# Patient Record
Sex: MALE | Race: WHITE | Employment: FULL TIME | URBAN - METROPOLITAN AREA
[De-identification: names, ages, dates, MRNs, and addresses within clinical notes are randomized per-mention and may not be internally consistent; named-entity substitution may affect disease eponyms.]

---

## 2020-12-20 ENCOUNTER — HOSPITAL ENCOUNTER (EMERGENCY)
Age: 40
Discharge: HOME | End: 2020-12-20
Attending: EMERGENCY MEDICINE
Payer: COMMERCIAL

## 2020-12-20 VITALS
SYSTOLIC BLOOD PRESSURE: 162 MMHG | DIASTOLIC BLOOD PRESSURE: 97 MMHG | TEMPERATURE: 98.2 F | OXYGEN SATURATION: 99 % | HEIGHT: 65 IN | RESPIRATION RATE: 17 BRPM | HEART RATE: 64 BPM | WEIGHT: 135 LBS | BODY MASS INDEX: 22.49 KG/M2

## 2020-12-20 PROCEDURE — 6370000000 HC RX 637 (ALT 250 FOR IP): Performed by: EMERGENCY MEDICINE

## 2020-12-20 PROCEDURE — 99283 EMERGENCY DEPT VISIT LOW MDM: CPT

## 2020-12-20 RX ORDER — PREDNISONE 20 MG/1
40 TABLET ORAL DAILY
Qty: 10 TABLET | Refills: 0 | Status: SHIPPED | OUTPATIENT
Start: 2020-12-20 | End: 2020-12-20 | Stop reason: SDUPTHER

## 2020-12-20 RX ORDER — PREDNISONE 20 MG/1
60 TABLET ORAL ONCE
Status: COMPLETED | OUTPATIENT
Start: 2020-12-20 | End: 2020-12-20

## 2020-12-20 RX ORDER — PREDNISONE 20 MG/1
40 TABLET ORAL DAILY
Qty: 10 TABLET | Refills: 0 | Status: SHIPPED | OUTPATIENT
Start: 2020-12-20 | End: 2020-12-25

## 2020-12-20 RX ADMIN — PREDNISONE 60 MG: 20 TABLET ORAL at 09:09

## 2020-12-20 ASSESSMENT — ENCOUNTER SYMPTOMS
SORE THROAT: 1
ABDOMINAL PAIN: 0
VOMITING: 0
SHORTNESS OF BREATH: 0
NAUSEA: 0
EYE PAIN: 0
CHEST TIGHTNESS: 0

## 2020-12-20 NOTE — ED PROVIDER NOTES
[address] [facility] ED  EMERGENCY DEPARTMENT ENCOUNTER      Pt Name: Layla Tapia  MRN: [id number]  [place] 1980  Date of evaluation: 12/20/2020  Provider: Bart Roblero DO    CHIEF COMPLAINT       Chief Complaint   Patient presents with    Other     uvula swollen since this mornging          HISTORY OF PRESENT ILLNESS   (Location/Symptom, Timing/Onset, Context/Setting, Quality, Duration, Modifying Factors, Severity)  Note limiting factors. Layla Tapia is a 36 y.o. male who presents to the emergency department . Patient comes in with swollen uvula. He states that he is gagging on it. He is not having trouble swallowing or talking or eating or drinking. No other areas of his mouth are swollen. He is not on any medications or over-the-counter remedies. No recent ingestion of caustic agents or overly hot liquids. Otherwise he generally feels fine. HPI    Nursing Notes were reviewed. REVIEW OF SYSTEMS    (2-9 systems for level 4, 10 or more for level 5)     Review of Systems   Constitutional: Negative for activity change, appetite change, fatigue and fever. HENT: Positive for sore throat. Negative for congestion. Eyes: Negative for pain and visual disturbance. Respiratory: Negative for chest tightness and shortness of breath. Cardiovascular: Negative for chest pain. Gastrointestinal: Negative for abdominal pain, nausea and vomiting. Endocrine: Negative for polydipsia. Genitourinary: Negative for flank pain and urgency. Musculoskeletal: Negative for gait problem and neck stiffness. Skin: Negative for rash. Neurological: Negative for weakness, light-headedness and headaches. Psychiatric/Behavioral: Negative for confusion and sleep disturbance. Except as noted above the remainder of the review of systems was reviewed and negative.        PAST MEDICAL HISTORY     Past Medical History:   Diagnosis Date    COVID-19          SURGICAL HISTORY     History reviewed. No pertinent surgical history. CURRENT MEDICATIONS       Previous Medications    CELECOXIB (CELEBREX) 200 MG CAPSULE    Take 200 mg by mouth 2 times daily. IBUPROFEN (ADVIL;MOTRIN) 200 MG TABLET    Take 200 mg by mouth every 6 hours as needed. Take 4 pills in the am and 4 pills at night        ALLERGIES     Ceclor [cefaclor]    FAMILY HISTORY     History reviewed. No pertinent family history.        SOCIAL HISTORY       Social History     Socioeconomic History    Marital status:      Spouse name: None    Number of children: None    Years of education: None    Highest education level: None   Occupational History    None   Social Needs    Financial resource strain: None    Food insecurity     Worry: None     Inability: None    Transportation needs     Medical: None     Non-medical: None   Tobacco Use    Smoking status: Never Smoker    Smokeless tobacco: Never Used   Substance and Sexual Activity    Alcohol use: Yes     Comment: rare    Drug use: Never    Sexual activity: Never   Lifestyle    Physical activity     Days per week: None     Minutes per session: None    Stress: None   Relationships    Social connections     Talks on phone: None     Gets together: None     Attends Latter day service: None     Active member of club or organization: None     Attends meetings of clubs or organizations: None     Relationship status: None    Intimate partner violence     Fear of current or ex partner: None     Emotionally abused: None     Physically abused: None     Forced sexual activity: None   Other Topics Concern    None   Social History Narrative    None       SCREENINGS                        PHYSICAL EXAM    (up to 7 for level 4, 8 or more for level 5)     ED Triage Vitals [12/20/20 0836]   BP Temp Temp Source Pulse Resp SpO2 Height Weight   (!) 162/97 98.2 °F (36.8 °C) Temporal 64 17 99 % 5' 5\" (1.651 m) 135 lb (61.2 kg)       Physical Exam  Vitals signs and nursing note reviewed. Constitutional:       General: He is not in acute distress. Appearance: He is well-developed. He is not diaphoretic. HENT:      Head: Normocephalic and atraumatic. Right Ear: External ear normal.      Left Ear: External ear normal.      Mouth/Throat:      Pharynx: No oropharyngeal exudate. Comments: Uvula mildly swollen. Uvula midline. No tonsillar swelling. Tongue and lips within normal limits  Eyes:      Conjunctiva/sclera: Conjunctivae normal.      Pupils: Pupils are equal, round, and reactive to light. Neck:      Musculoskeletal: Normal range of motion and neck supple. Thyroid: No thyromegaly. Vascular: No JVD. Trachea: No tracheal deviation. Cardiovascular:      Rate and Rhythm: Normal rate. Heart sounds: Normal heart sounds. No murmur. Pulmonary:      Effort: Pulmonary effort is normal. No respiratory distress. Breath sounds: Normal breath sounds. No wheezing. Abdominal:      General: Bowel sounds are normal.      Palpations: Abdomen is soft. Tenderness: There is no abdominal tenderness. There is no guarding. Musculoskeletal: Normal range of motion. Skin:     General: Skin is warm and dry. Findings: No rash. Neurological:      Mental Status: He is alert and oriented to person, place, and time. Cranial Nerves: No cranial nerve deficit.    Psychiatric:         Behavior: Behavior normal.         DIAGNOSTIC RESULTS     EKG: All EKG's are interpreted by the Emergency Department Physician who either signs or Co-signs this chart in the absence of a cardiologist.        RADIOLOGY:   Non-plain film images such as CT, Ultrasound and MRI are read by the radiologist. Plain radiographic images are visualized and preliminarily interpreted by the emergency physician with the below findings:        Interpretation per the Radiologist below, if available at the time of this note:    No orders to display         ED BEDSIDE ULTRASOUND:   Performed

## 2021-12-27 ENCOUNTER — APPOINTMENT (OUTPATIENT)
Dept: GENERAL RADIOLOGY | Age: 41
End: 2021-12-27
Payer: COMMERCIAL

## 2021-12-27 ENCOUNTER — HOSPITAL ENCOUNTER (EMERGENCY)
Age: 41
Discharge: HOME | End: 2021-12-27
Payer: COMMERCIAL

## 2021-12-27 VITALS
OXYGEN SATURATION: 99 % | SYSTOLIC BLOOD PRESSURE: 137 MMHG | WEIGHT: 197 LBS | RESPIRATION RATE: 16 BRPM | BODY MASS INDEX: 32.78 KG/M2 | HEART RATE: 57 BPM | DIASTOLIC BLOOD PRESSURE: 88 MMHG | TEMPERATURE: 97.7 F

## 2021-12-27 DIAGNOSIS — R07.89 ATYPICAL CHEST PAIN: Primary | ICD-10-CM

## 2021-12-27 LAB
ALBUMIN SERPL-MCNC: 4.8 G/DL (ref 3.5–4.6)
ALP BLD-CCNC: 58 U/L (ref 35–104)
ALT SERPL-CCNC: 29 U/L (ref 0–41)
ANION GAP SERPL CALCULATED.3IONS-SCNC: 11 MEQ/L (ref 9–15)
AST SERPL-CCNC: 19 U/L (ref 0–40)
BASOPHILS ABSOLUTE: 0 K/UL (ref 0–0.2)
BASOPHILS RELATIVE PERCENT: 0.8 %
BILIRUB SERPL-MCNC: 0.3 MG/DL (ref 0.2–0.7)
BUN BLDV-MCNC: 13 MG/DL (ref 6–20)
CALCIUM SERPL-MCNC: 9.6 MG/DL (ref 8.5–9.9)
CHLORIDE BLD-SCNC: 99 MEQ/L (ref 95–107)
CO2: 27 MEQ/L (ref 20–31)
CREAT SERPL-MCNC: 0.88 MG/DL (ref 0.7–1.2)
EOSINOPHILS ABSOLUTE: 0.2 K/UL (ref 0–0.7)
EOSINOPHILS RELATIVE PERCENT: 3.2 %
GFR AFRICAN AMERICAN: >60
GFR NON-AFRICAN AMERICAN: >60
GLOBULIN: 2.8 G/DL (ref 2.3–3.5)
GLUCOSE BLD-MCNC: 92 MG/DL (ref 70–99)
HCT VFR BLD CALC: 46.6 % (ref 42–52)
HEMOGLOBIN: 15.7 G/DL (ref 14–18)
LYMPHOCYTES ABSOLUTE: 2.5 K/UL (ref 1–4.8)
LYMPHOCYTES RELATIVE PERCENT: 43.1 %
MAGNESIUM: 2.3 MG/DL (ref 1.7–2.4)
MCH RBC QN AUTO: 28.1 PG (ref 27–31.3)
MCHC RBC AUTO-ENTMCNC: 33.6 % (ref 33–37)
MCV RBC AUTO: 83.6 FL (ref 80–100)
MONOCYTES ABSOLUTE: 0.4 K/UL (ref 0.2–0.8)
MONOCYTES RELATIVE PERCENT: 6.9 %
NEUTROPHILS ABSOLUTE: 2.7 K/UL (ref 1.4–6.5)
NEUTROPHILS RELATIVE PERCENT: 46 %
PDW BLD-RTO: 13.8 % (ref 11.5–14.5)
PLATELET # BLD: 186 K/UL (ref 130–400)
POTASSIUM SERPL-SCNC: 4 MEQ/L (ref 3.4–4.9)
RBC # BLD: 5.58 M/UL (ref 4.7–6.1)
SODIUM BLD-SCNC: 137 MEQ/L (ref 135–144)
TOTAL PROTEIN: 7.6 G/DL (ref 6.3–8)
TROPONIN: <0.01 NG/ML (ref 0–0.01)
WBC # BLD: 5.9 K/UL (ref 4.8–10.8)

## 2021-12-27 PROCEDURE — 93005 ELECTROCARDIOGRAM TRACING: CPT | Performed by: PHYSICIAN ASSISTANT

## 2021-12-27 PROCEDURE — 85025 COMPLETE CBC W/AUTO DIFF WBC: CPT

## 2021-12-27 PROCEDURE — 71046 X-RAY EXAM CHEST 2 VIEWS: CPT

## 2021-12-27 PROCEDURE — 84484 ASSAY OF TROPONIN QUANT: CPT

## 2021-12-27 PROCEDURE — 80053 COMPREHEN METABOLIC PANEL: CPT

## 2021-12-27 PROCEDURE — 99284 EMERGENCY DEPT VISIT MOD MDM: CPT

## 2021-12-27 PROCEDURE — 83735 ASSAY OF MAGNESIUM: CPT

## 2021-12-27 PROCEDURE — 36415 COLL VENOUS BLD VENIPUNCTURE: CPT

## 2021-12-27 ASSESSMENT — ENCOUNTER SYMPTOMS
BLOOD IN STOOL: 0
EYE DISCHARGE: 0
SHORTNESS OF BREATH: 0
VOICE CHANGE: 0
ABDOMINAL DISTENTION: 0
VOMITING: 0
ANAL BLEEDING: 0
NAUSEA: 0
COLOR CHANGE: 0

## 2021-12-27 ASSESSMENT — PAIN SCALES - GENERAL: PAINLEVEL_OUTOF10: 7

## 2021-12-27 ASSESSMENT — PAIN DESCRIPTION - LOCATION: LOCATION: CHEST

## 2021-12-27 ASSESSMENT — PAIN DESCRIPTION - PAIN TYPE: TYPE: ACUTE PAIN

## 2021-12-27 ASSESSMENT — PAIN DESCRIPTION - DESCRIPTORS: DESCRIPTORS: TIGHTNESS

## 2021-12-27 ASSESSMENT — PAIN DESCRIPTION - ORIENTATION: ORIENTATION: LEFT

## 2021-12-28 LAB
EKG ATRIAL RATE: 55 BPM
EKG P AXIS: -3 DEGREES
EKG P-R INTERVAL: 160 MS
EKG Q-T INTERVAL: 394 MS
EKG QRS DURATION: 94 MS
EKG QTC CALCULATION (BAZETT): 376 MS
EKG R AXIS: 40 DEGREES
EKG T AXIS: 30 DEGREES
EKG VENTRICULAR RATE: 55 BPM

## 2021-12-28 PROCEDURE — 93010 ELECTROCARDIOGRAM REPORT: CPT | Performed by: INTERNAL MEDICINE

## 2021-12-28 NOTE — ED PROVIDER NOTES
[address] [facility] ED  eMERGENCY dEPARTMENT eNCOUnter      Pt Name: Raegan Chow  MRN: [id number]  [place] 1980  Date of evaluation: 12/27/2021  Provider: Oli Escobedo Dr       Chief Complaint   Patient presents with    Chest Pain     x 3 days         HISTORY OF PRESENT ILLNESS   (Location/Symptom, Timing/Onset,Context/Setting, Quality, Duration, Modifying Factors, Severity)  Note limiting factors. Raegan Chow is a 39 y.o. male who presents to the emergency department pt has 4 day hx of cp lt sided and lt arm pain he denies n/v/d/sob. He is concerned re bronchitis. Symptoms mild to moderate in severity, nothing improves or worsens symptoms. HPI    NursingNotes were reviewed. REVIEW OF SYSTEMS    (2-9 systems for level 4, 10 or more for level 5)     Review of Systems   Constitutional: Negative for activity change, appetite change and unexpected weight change. HENT: Negative for ear discharge, nosebleeds and voice change. Eyes: Negative for discharge. Respiratory: Negative for shortness of breath. Cardiovascular: Positive for chest pain. Gastrointestinal: Negative for abdominal distention, anal bleeding, blood in stool, nausea and vomiting. Genitourinary: Negative for hematuria. Musculoskeletal: Positive for arthralgias and neck pain. Negative for joint swelling and neck stiffness. Skin: Negative for color change. Neurological: Negative for headaches. Psychiatric/Behavioral: Negative for sleep disturbance. All other systems reviewed and are negative. Except as noted above the remainder of the review of systems was reviewed and negative. PAST MEDICAL HISTORY     Past Medical History:   Diagnosis Date    COVID-19          SURGICALHISTORY     No past surgical history on file.       CURRENT MEDICATIONS       Current Discharge Medication List      CONTINUE these medications which have NOT CHANGED    Details   ibuprofen (ADVIL;MOTRIN) 200 MG tablet Take 200 mg by mouth every 6 hours as needed. Take 4 pills in the am and 4 pills at night                   Ceclor [cefaclor]    FAMILY HISTORY     No family history on file. SOCIAL HISTORY       Social History     Socioeconomic History    Marital status:      Spouse name: Not on file    Number of children: Not on file    Years of education: Not on file    Highest education level: Not on file   Occupational History    Not on file   Tobacco Use    Smoking status: Never Smoker    Smokeless tobacco: Never Used   Substance and Sexual Activity    Alcohol use: Yes     Comment: rare    Drug use: Never    Sexual activity: Never   Other Topics Concern    Not on file   Social History Narrative    Not on file     Social Determinants of Health     Financial Resource Strain:     Difficulty of Paying Living Expenses: Not on file   Food Insecurity:     Worried About Running Out of Food in the Last Year: Not on file    Lauro of Food in the Last Year: Not on file   Transportation Needs:     Lack of Transportation (Medical): Not on file    Lack of Transportation (Non-Medical):  Not on file   Physical Activity:     Days of Exercise per Week: Not on file    Minutes of Exercise per Session: Not on file   Stress:     Feeling of Stress : Not on file   Social Connections:     Frequency of Communication with Friends and Family: Not on file    Frequency of Social Gatherings with Friends and Family: Not on file    Attends Taoist Services: Not on file    Active Member of Clubs or Organizations: Not on file    Attends Club or Organization Meetings: Not on file    Marital Status: Not on file   Intimate Partner Violence:     Fear of Current or Ex-Partner: Not on file    Emotionally Abused: Not on file    Physically Abused: Not on file    Sexually Abused: Not on file   Housing Stability:     Unable to Pay for Housing in the Last Year: Not on file    Number of Places Lived in the Last Year: Not on file    Unstable Housing in the Last Year: Not on file       SCREENINGS   Ebola Virus Disease (EVD) Screening   Temp: 97.7 °F (36.5 °C)  CIWA Assessment  BP: 137/88  Pulse: 57    PHYSICAL EXAM    (up to 7 for level 4, 8 or more for level 5)     ED Triage Vitals [12/27/21 1943]   BP Temp Temp Source Pulse Resp SpO2 Height Weight   (!) 165/105 97.7 °F (36.5 °C) Oral 57 16 98 % -- 197 lb (89.4 kg)       Physical Exam  Vitals and nursing note reviewed. Constitutional:       General: He is not in acute distress. Appearance: He is well-developed. HENT:      Head: Normocephalic and atraumatic. Right Ear: External ear normal.      Left Ear: External ear normal.      Nose: Nose normal.      Mouth/Throat:      Mouth: Mucous membranes are moist.   Eyes:      General:         Right eye: No discharge. Left eye: No discharge. Pupils: Pupils are equal, round, and reactive to light. Cardiovascular:      Rate and Rhythm: Normal rate and regular rhythm. Heart sounds: Normal heart sounds. Pulmonary:      Effort: Pulmonary effort is normal. No respiratory distress. Breath sounds: Normal breath sounds. No stridor. No wheezing, rhonchi or rales. Chest:      Chest wall: Tenderness present. Abdominal:      General: Bowel sounds are normal. There is no distension. Palpations: Abdomen is soft. Musculoskeletal:         General: Normal range of motion. Cervical back: Normal range of motion and neck supple. Skin:     General: Skin is warm. Findings: No erythema. Neurological:      Mental Status: He is alert and oriented to person, place, and time. RESULTS     EKG: All EKG's are interpreted by the Emergency Department Physician who either signs or Co-signsthis chart in the absence of a cardiologist.  Sinus toby rate 55 pr 160 ms, qrs 94 ms prt axes positive.        RADIOLOGY:   Non-plain filmimages such as CT, Ultrasound and MRI are read by the radiologist. Amanda Dickey radiographic images are visualized and preliminarily interpreted by the emergency physician with the below findings:         Interpretation per the Radiologist below, if available at the time ofthis note:    XR CHEST (2 VW)    (Results Pending)         ED BEDSIDE ULTRASOUND:   Performed by ED Physician - none    LABS:  Labs Reviewed   COMPREHENSIVE METABOLIC PANEL - Abnormal; Notable for the following components:       Result Value    Albumin 4.8 (*)     All other components within normal limits   CBC WITH AUTO DIFFERENTIAL   MAGNESIUM   TROPONIN       All other labs were within normal range or not returned as of this dictation. EMERGENCY DEPARTMENT COURSE and DIFFERENTIAL DIAGNOSIS/MDM:   Vitals:    Vitals:    12/27/21 1943 12/27/21 2115   BP: (!) 165/105 137/88   Pulse: 57 57   Resp: 16 16   Temp: 97.7 °F (36.5 °C)    TempSrc: Oral    SpO2: 98% 99%   Weight: 197 lb (89.4 kg)             MDM  Number of Diagnoses or Management Options  Atypical chest pain  Diagnosis management comments: Pt has reproducible cp with palpation and neg trop. He will take his ibuprofen and return to ER if any symptoms worsen or new symptoms develop. Amount and/or Complexity of Data Reviewed  Clinical lab tests: reviewed and ordered  Tests in the radiology section of CPT®: reviewed and ordered          CONSULTS:  None    PROCEDURES:  Unless otherwise noted below, none     Procedures    FINAL IMPRESSION      1.  Atypical chest pain          DISPOSITION/PLAN   DISPOSITION        PATIENT REFERRED TO:  Jaci Alvarado MD  [address] [facility] [address]     Call in 1 day      [facility]) ED  [address] [facility]  [address] [facility] [id number] [phone]    If symptoms worsen      DISCHARGE MEDICATIONS:  Current Discharge Medication List             (Please note that portions of this note were completed with a voice recognition program.  Efforts were made to edit the dictations but occasionally words are mis-transcribed.)    Samm Hurst PA-C (electronically signed)  Attending Emergency Physician       Samm Hurst PA-C  12/27/21 7310

## 2022-01-05 ENCOUNTER — NURSE ONLY (OUTPATIENT)
Dept: PRIMARY CARE CLINIC | Age: 42
End: 2022-01-05

## 2022-01-05 ENCOUNTER — VIRTUAL VISIT (OUTPATIENT)
Dept: FAMILY MEDICINE CLINIC | Age: 42
End: 2022-01-05
Payer: COMMERCIAL

## 2022-01-05 DIAGNOSIS — R52 GENERALIZED BODY ACHES: ICD-10-CM

## 2022-01-05 DIAGNOSIS — U07.1 COVID-19: Primary | ICD-10-CM

## 2022-01-05 DIAGNOSIS — B34.9 HEADACHE DUE TO VIRAL INFECTION: ICD-10-CM

## 2022-01-05 DIAGNOSIS — Z20.822 EXPOSURE TO COVID-19 VIRUS: ICD-10-CM

## 2022-01-05 DIAGNOSIS — J02.9 SORE THROAT: ICD-10-CM

## 2022-01-05 DIAGNOSIS — R50.9 FEVER, UNSPECIFIED FEVER CAUSE: ICD-10-CM

## 2022-01-05 DIAGNOSIS — R51.9 HEADACHE DUE TO VIRAL INFECTION: ICD-10-CM

## 2022-01-05 LAB
Lab: ABNORMAL
PERFORMING INSTRUMENT: ABNORMAL
QC PASS/FAIL: ABNORMAL
SARS-COV-2, POC: DETECTED

## 2022-01-05 PROCEDURE — 87426 SARSCOV CORONAVIRUS AG IA: CPT | Performed by: NURSE PRACTITIONER

## 2022-01-05 PROCEDURE — G8427 DOCREV CUR MEDS BY ELIG CLIN: HCPCS | Performed by: NURSE PRACTITIONER

## 2022-01-05 PROCEDURE — 99213 OFFICE O/P EST LOW 20 MIN: CPT | Performed by: NURSE PRACTITIONER

## 2022-01-05 ASSESSMENT — ENCOUNTER SYMPTOMS
ABDOMINAL PAIN: 0
COUGH: 1
SORE THROAT: 1
CHEST TIGHTNESS: 1
RHINORRHEA: 1
SHORTNESS OF BREATH: 0
DIARRHEA: 0
NAUSEA: 0

## 2022-01-05 NOTE — PROGRESS NOTES
TELEHEALTH EVALUATION -- Audio/Visual (During  public health emergency)    -   Juan Carlos Zapata is a 39 y.o. male being evaluated by a Virtual Visit (video visit) encounter to address concerns as mentioned above. A caregiver was present when appropriate. Due to this being a TeleHealth encounter (During X-50 public health emergency), evaluation of the following organ systems was limited: Vitals/Constitutional/EENT/Resp/CV/GI//MS/Neuro/Skin/Heme-Lymph-Imm. Pursuant to the emergency declaration under the [facility], [address] [place] [address] authority and the [employer] Act, this Virtual Visit was conducted with patient's (and/or legal guardian's) consent, to reduce the patient's risk of exposure to COVID-19 and provide necessary medical care. The patient (and/or legal guardian) has also been advised to contact this office for worsening conditions or problems, and seek emergency medical treatment and/or call 911 if deemed necessary. Patient was contacted and agreed to proceed with a virtual visit via Telephone Visit  The risks and benefits of converting to a virtual visit were discussed in light of the current infectious disease epidemic. Patient also understood that insurance coverage and co-pays are up to their individual insurance plans. Patient was located at their home. Provider was located at their office.      2022  Juan Carlos Zapata (:  1980) has requested an audio/video evaluation for the following concern(s):    HPI   VV Audio for COVID-19 testing   exposure COVID-19   #2 Moderna vaccine 2021  Symptoms started yesterday   Nasal congestion, cough, chest tightness, h/a, sore throat, body aches and nasal drainage  Cough is dry   Denies SOB  Denies chest pain   Febrile, with chills   Eating ad drinking well   Sleep uninterrupted                   Review of Systems   Constitutional: Positive for chills and fever. Negative for activity change, appetite change, diaphoresis and fatigue. HENT: Positive for congestion, rhinorrhea and sore throat. Negative for ear pain. Respiratory: Positive for cough and chest tightness. Negative for shortness of breath. Cardiovascular: Negative for chest pain and palpitations. Gastrointestinal: Negative for abdominal pain, diarrhea and nausea. Musculoskeletal: Positive for myalgias. Neurological: Positive for headaches. Negative for dizziness and light-headedness. Psychiatric/Behavioral: Negative for sleep disturbance. Prior to Visit Medications    Medication Sig Taking? Authorizing Provider   ibuprofen (ADVIL;MOTRIN) 200 MG tablet Take 200 mg by mouth every 6 hours as needed. Take 4 pills in the am and 4 pills at night   Historical Provider, MD   celecoxib (CELEBREX) 200 MG capsule Take 200 mg by mouth 2 times daily. Historical Provider, MD       Past Medical History:   Diagnosis Date    COVID-19      No past surgical history on file. Social History     Socioeconomic History    Marital status:      Spouse name: Not on file    Number of children: Not on file    Years of education: Not on file    Highest education level: Not on file   Occupational History    Not on file   Tobacco Use    Smoking status: Never Smoker    Smokeless tobacco: Never Used   Substance and Sexual Activity    Alcohol use: Yes     Comment: rare    Drug use: Never    Sexual activity: Never   Other Topics Concern    Not on file   Social History Narrative    Not on file     Social Determinants of Health     Financial Resource Strain:     Difficulty of Paying Living Expenses: Not on file   Food Insecurity:     Worried About Running Out of Food in the Last Year: Not on file    Lauro of Food in the Last Year: Not on file   Transportation Needs:     Lack of Transportation (Medical): Not on file    Lack of Transportation (Non-Medical):  Not on file Physical Activity:     Days of Exercise per Week: Not on file    Minutes of Exercise per Session: Not on file   Stress:     Feeling of Stress : Not on file   Social Connections:     Frequency of Communication with Friends and Family: Not on file    Frequency of Social Gatherings with Friends and Family: Not on file    Attends Baptism Services: Not on file    Active Member of Clubs or Organizations: Not on file    Attends Club or Organization Meetings: Not on file    Marital Status: Not on file   Intimate Partner Violence:     Fear of Current or Ex-Partner: Not on file    Emotionally Abused: Not on file    Physically Abused: Not on file    Sexually Abused: Not on file   Housing Stability:     Unable to Pay for Housing in the Last Year: Not on file    Number of [place] in the Last Year: Not on file    Unstable Housing in the Last Year: Not on file     No family history on file. Allergies   Allergen Reactions    Ceclor [Cefaclor]        PMH, Surgical Hx, Family Hx, and Social Hx reviewed and updated. PHYSICAL EXAMINATION: N/A. VV Audio    Oriented and conversant   No audible distress   No cough throughout visit         Other pertinent observable physical exam findings-   Results for orders placed or performed in visit on 01/05/22   POCT COVID-19, Antigen   Result Value Ref Range    SARS-COV-2, POC Detected (A) Not Detected    Lot Number [id number]     QC Pass/Fail pass     Performing Instrument BD Veritor        ASSESSMENT/PLAN:  Assessment & Plan   Liana Goncalves was seen today for covid testing.     Diagnoses and all orders for this visit:    COVID-19    Exposure to COVID-19 virus  -     POCT COVID-19, Antigen    Fever, unspecified fever cause  -     POCT COVID-19, Antigen    Headache due to viral infection  -     POCT COVID-19, Antigen    Generalized body aches  -     POCT COVID-19, Antigen    Sore throat  -     POCT COVID-19, Antigen      Orders Placed This Encounter   Procedures    POCT COVID-19, Antigen     Order Specific Question:   Is this test for diagnosis or screening? Answer:   Diagnosis of ill patient     Order Specific Question:   Symptomatic for COVID-19 as defined by CDC? Answer:   Yes     Order Specific Question:   Date of Symptom Onset     Answer:   1/3/2022     Order Specific Question:   Hospitalized for COVID-19? Answer:   No     Order Specific Question:   Admitted to ICU for COVID-19? Answer:   No     Order Specific Question:   Employed in healthcare setting? Answer:   No     Order Specific Question:   Resident in a congregate (group) care setting? Answer:   No     Order Specific Question:   Pregnant: Answer:   No     Order Specific Question:   Previously tested for COVID-19? Answer:   No     No orders of the defined types were placed in this encounter. There are no discontinued medications. If you experience any of the red flag s/s, seek care at the ER        Reviewed with the patient: current clinical status & that he has tested positive for COVID-19. Pt aware to remain on home isolation based on test result. Pt instructed on red flag s/s to go to the ER for or to call 911. Pt verbalized understanding. When to call for help  Call 911 anytime you think you may need emergency care. For example, call if:  · You have severe trouble breathing. · You have severe dehydration. I have reviewed the patient's medical history in detail and updated the computerized patient record. Patient identification was verified at the start of the visit: Yes  Total time spent on this encounter: 20 minutes  >50% of 20 minutes was spent spent on counseling, answering questions, instructions on meds & testing & coordinating the care based on my plan and assessment as noted. --TITUS Cortés NP on 1/6/2022 at 1:08 AM    An electronic signature was used to authenticate this note.

## 2022-05-15 ENCOUNTER — HOSPITAL ENCOUNTER (EMERGENCY)
Age: 42
Discharge: HOME | End: 2022-05-16
Attending: STUDENT IN AN ORGANIZED HEALTH CARE EDUCATION/TRAINING PROGRAM
Payer: COMMERCIAL

## 2022-05-15 ENCOUNTER — NURSE TRIAGE (OUTPATIENT)
Dept: OTHER | Facility: CLINIC | Age: 42
End: 2022-05-15

## 2022-05-15 ENCOUNTER — APPOINTMENT (OUTPATIENT)
Dept: GENERAL RADIOLOGY | Age: 42
End: 2022-05-15
Payer: COMMERCIAL

## 2022-05-15 VITALS
RESPIRATION RATE: 20 BRPM | SYSTOLIC BLOOD PRESSURE: 151 MMHG | HEIGHT: 66 IN | BODY MASS INDEX: 33.11 KG/M2 | OXYGEN SATURATION: 96 % | TEMPERATURE: 98.8 F | DIASTOLIC BLOOD PRESSURE: 107 MMHG | WEIGHT: 206 LBS | HEART RATE: 73 BPM

## 2022-05-15 DIAGNOSIS — M79.602 PAIN OF LEFT UPPER EXTREMITY: Primary | ICD-10-CM

## 2022-05-15 DIAGNOSIS — R07.89 CHEST WALL PAIN: ICD-10-CM

## 2022-05-15 DIAGNOSIS — I10 ASYMPTOMATIC HYPERTENSION: ICD-10-CM

## 2022-05-15 LAB
ALBUMIN SERPL-MCNC: 4.5 G/DL (ref 3.5–4.6)
ALP BLD-CCNC: 60 U/L (ref 35–104)
ALT SERPL-CCNC: 26 U/L (ref 0–41)
ANION GAP SERPL CALCULATED.3IONS-SCNC: 12 MEQ/L (ref 9–15)
AST SERPL-CCNC: 18 U/L (ref 0–40)
BASOPHILS ABSOLUTE: 0.1 K/UL (ref 0–0.2)
BASOPHILS RELATIVE PERCENT: 0.9 %
BILIRUB SERPL-MCNC: 0.3 MG/DL (ref 0.2–0.7)
BUN BLDV-MCNC: 14 MG/DL (ref 6–20)
CALCIUM SERPL-MCNC: 9.4 MG/DL (ref 8.5–9.9)
CHLORIDE BLD-SCNC: 105 MEQ/L (ref 95–107)
CO2: 22 MEQ/L (ref 20–31)
CREAT SERPL-MCNC: 1.07 MG/DL (ref 0.7–1.2)
EOSINOPHILS ABSOLUTE: 0.1 K/UL (ref 0–0.7)
EOSINOPHILS RELATIVE PERCENT: 2 %
GFR AFRICAN AMERICAN: >60
GFR NON-AFRICAN AMERICAN: >60
GLOBULIN: 2.4 G/DL (ref 2.3–3.5)
GLUCOSE BLD-MCNC: 111 MG/DL (ref 70–99)
HCT VFR BLD CALC: 41.9 % (ref 42–52)
HEMOGLOBIN: 14.4 G/DL (ref 14–18)
LYMPHOCYTES ABSOLUTE: 2.2 K/UL (ref 1–4.8)
LYMPHOCYTES RELATIVE PERCENT: 29.6 %
MCH RBC QN AUTO: 28.8 PG (ref 27–31.3)
MCHC RBC AUTO-ENTMCNC: 34.3 % (ref 33–37)
MCV RBC AUTO: 83.9 FL (ref 80–100)
MONOCYTES ABSOLUTE: 0.4 K/UL (ref 0.2–0.8)
MONOCYTES RELATIVE PERCENT: 5.9 %
NEUTROPHILS ABSOLUTE: 4.6 K/UL (ref 1.4–6.5)
NEUTROPHILS RELATIVE PERCENT: 61.6 %
PDW BLD-RTO: 13.4 % (ref 11.5–14.5)
PLATELET # BLD: 202 K/UL (ref 130–400)
POTASSIUM SERPL-SCNC: 3.6 MEQ/L (ref 3.4–4.9)
RBC # BLD: 4.99 M/UL (ref 4.7–6.1)
SODIUM BLD-SCNC: 139 MEQ/L (ref 135–144)
TOTAL PROTEIN: 6.9 G/DL (ref 6.3–8)
TROPONIN: <0.01 NG/ML (ref 0–0.01)
WBC # BLD: 7.5 K/UL (ref 4.8–10.8)

## 2022-05-15 PROCEDURE — 85025 COMPLETE CBC W/AUTO DIFF WBC: CPT

## 2022-05-15 PROCEDURE — 84484 ASSAY OF TROPONIN QUANT: CPT

## 2022-05-15 PROCEDURE — 36415 COLL VENOUS BLD VENIPUNCTURE: CPT

## 2022-05-15 PROCEDURE — 99285 EMERGENCY DEPT VISIT HI MDM: CPT

## 2022-05-15 PROCEDURE — 71045 X-RAY EXAM CHEST 1 VIEW: CPT

## 2022-05-15 PROCEDURE — 6370000000 HC RX 637 (ALT 250 FOR IP): Performed by: STUDENT IN AN ORGANIZED HEALTH CARE EDUCATION/TRAINING PROGRAM

## 2022-05-15 PROCEDURE — 93005 ELECTROCARDIOGRAM TRACING: CPT | Performed by: PERSONAL EMERGENCY RESPONSE ATTENDANT

## 2022-05-15 PROCEDURE — 80053 COMPREHEN METABOLIC PANEL: CPT

## 2022-05-15 RX ORDER — METHOCARBAMOL 500 MG/1
1000 TABLET, FILM COATED ORAL 4 TIMES DAILY PRN
Qty: 40 TABLET | Refills: 0 | Status: SHIPPED | OUTPATIENT
Start: 2022-05-15 | End: 2022-05-22

## 2022-05-15 RX ORDER — METHOCARBAMOL 500 MG/1
1500 TABLET, FILM COATED ORAL ONCE
Status: COMPLETED | OUTPATIENT
Start: 2022-05-15 | End: 2022-05-15

## 2022-05-15 RX ORDER — ACETAMINOPHEN 500 MG
1000 TABLET ORAL EVERY 6 HOURS PRN
Qty: 60 TABLET | Refills: 0 | Status: SHIPPED | OUTPATIENT
Start: 2022-05-15

## 2022-05-15 RX ORDER — IBUPROFEN 400 MG/1
400 TABLET ORAL ONCE
Status: COMPLETED | OUTPATIENT
Start: 2022-05-15 | End: 2022-05-15

## 2022-05-15 RX ORDER — IBUPROFEN 400 MG/1
400 TABLET ORAL EVERY 6 HOURS PRN
Qty: 120 TABLET | Refills: 0 | Status: SHIPPED | OUTPATIENT
Start: 2022-05-15

## 2022-05-15 RX ORDER — ACETAMINOPHEN 500 MG
1000 TABLET ORAL ONCE
Status: COMPLETED | OUTPATIENT
Start: 2022-05-15 | End: 2022-05-15

## 2022-05-15 RX ADMIN — IBUPROFEN 400 MG: 400 TABLET, FILM COATED ORAL at 23:21

## 2022-05-15 RX ADMIN — ACETAMINOPHEN 1000 MG: 500 TABLET ORAL at 23:21

## 2022-05-15 RX ADMIN — METHOCARBAMOL 1500 MG: 500 TABLET ORAL at 23:22

## 2022-05-15 ASSESSMENT — PAIN - FUNCTIONAL ASSESSMENT: PAIN_FUNCTIONAL_ASSESSMENT: 0-10

## 2022-05-15 ASSESSMENT — ENCOUNTER SYMPTOMS
SORE THROAT: 0
SHORTNESS OF BREATH: 0
VOMITING: 0
DIARRHEA: 0
RHINORRHEA: 0
NAUSEA: 0
BACK PAIN: 0
COUGH: 0
EYE PAIN: 0
ABDOMINAL PAIN: 0

## 2022-05-15 ASSESSMENT — PAIN SCALES - GENERAL
PAINLEVEL_OUTOF10: 7
PAINLEVEL_OUTOF10: 7

## 2022-05-15 ASSESSMENT — PAIN DESCRIPTION - ORIENTATION: ORIENTATION: LEFT

## 2022-05-15 ASSESSMENT — PAIN DESCRIPTION - LOCATION: LOCATION: ARM

## 2022-05-16 LAB
EKG ATRIAL RATE: 72 BPM
EKG P AXIS: 40 DEGREES
EKG P-R INTERVAL: 174 MS
EKG Q-T INTERVAL: 360 MS
EKG QRS DURATION: 90 MS
EKG QTC CALCULATION (BAZETT): 394 MS
EKG R AXIS: 6 DEGREES
EKG T AXIS: 15 DEGREES
EKG VENTRICULAR RATE: 72 BPM

## 2022-05-16 PROCEDURE — 93010 ELECTROCARDIOGRAM REPORT: CPT | Performed by: INTERNAL MEDICINE

## 2022-05-16 NOTE — ED PROVIDER NOTES
[address] [facility] ED  eMERGENCY dEPARTMENT eNCOUnter      Pt Name: Layla Tapia  MRN: [id number]  [place] 1980  Date of evaluation: 5/15/2022  Provider: Maxx Donaldson MD      HISTORY OF PRESENT ILLNESS      Chief Complaint   Patient presents with    Hypertension     left arm pain        The history is provided by the Patient. Layla Tapia is a 43 y.o. male with a PMH clinically significant for HTN, HLD, Insomnia presenting to the ED via PV c/o left-sided chest pain, shoulder pain and forearm pain in addition to hypertension with initial onset at 4 PM today. 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 Started noticing it when it was more painful to clenches fist.  States worse pain is in the left forearm. Characterizes pain as aching, constant, mild to moderate in severity. Also states he has some mild aching in the left shoulder and left upper outer anterior chest wall. Denies any trauma to the areas. States he has been wrapping soccer, but denies any other increased strenuous activities or traumatic injuries. Denies any associated diaphoresis, shortness of breath, cough, abdominal pain, nausea, vomiting, lightheadedness, dizziness. No radiation of the pain to the jaw or anywhere else in the chest.  States he took his blood pressure multiple times and that it was always elevated, so came to the ED. Denies any personal history of CAD. Does state family history of CAD. Quit smoking 10 years ago. Denies any cocaine or illicit substance abuse. States he has otherwise been feeling well. Denies recent illnesses. No history of DVT/PE. Has otherwise been feeling well. Denies any history of sudden cardiac death. Has been taking his amlodipine as indicated. Is not on anything for high cholesterol. Per Chart Review: Most recent evaluation in the ED on 12/27/2021 appreciated. Complaining of chest pain and left arm pain at that time.   Evaluation largely unremarkable. Thought stable for discharge home. REVIEW OF SYSTEMS       Review of Systems   Constitutional: Negative for chills, diaphoresis, fatigue and fever. HENT: Negative for rhinorrhea and sore throat. Eyes: Negative for pain and visual disturbance. Respiratory: Negative for cough and shortness of breath. Cardiovascular: Positive for chest pain. Negative for palpitations and leg swelling. Gastrointestinal: Negative for abdominal pain, diarrhea, nausea and vomiting. Genitourinary: Negative for dysuria. Musculoskeletal: Positive for arthralgias and myalgias. Negative for back pain, neck pain and neck stiffness. Skin: Negative for rash. Neurological: Negative for dizziness, weakness, light-headedness, numbness and headaches. PAST MEDICAL HISTORY     Past Medical History:   Diagnosis Date    COVID-19        SURGICAL HISTORY     History reviewed. No pertinent surgical history. FAMILY HISTORY     History reviewed. No pertinent family history. SOCIAL HISTORY       Social History     Socioeconomic History    Marital status:      Spouse name: None    Number of children: None    Years of education: None    Highest education level: None   Occupational History    None   Tobacco Use    Smoking status: Never Smoker    Smokeless tobacco: Never Used   Substance and Sexual Activity    Alcohol use: Yes     Comment: rare    Drug use: Never    Sexual activity: Never   Other Topics Concern    None   Social History Narrative    None     Social Determinants of Health     Financial Resource Strain:     Difficulty of Paying Living Expenses: Not on file   Food Insecurity:     Worried About Running Out of Food in the Last Year: Not on file    Lauro of Food in the Last Year: Not on file   Transportation Needs:     Lack of Transportation (Medical): Not on file    Lack of Transportation (Non-Medical):  Not on file   Physical Activity:     Days of Exercise per Week: Not on file  Minutes of Exercise per Session: Not on file   Stress:     Feeling of Stress : Not on file   Social Connections:     Frequency of Communication with Friends and Family: Not on file    Frequency of Social Gatherings with Friends and Family: Not on file    Attends [employer] Services: Not on file    Active Member of [address] [place] [address] or Organizations: Not on file    Attends Club or Organization Meetings: Not on file    Marital Status: Not on file   Intimate Partner Violence:     Fear of Current or Ex-Partner: Not on file    Emotionally Abused: Not on file    Physically Abused: Not on file    Sexually Abused: Not on file   Housing Stability:     Unable to Pay for Housing in the Last Year: Not on file    Number of [place] in the Last Year: Not on file    Unstable Housing in the Last Year: Not on file       CURRENT MEDICATIONS       Previous Medications    No medications on file       ALLERGIES     Ceclor [cefaclor]      PHYSICAL EXAM       ED Triage Vitals [05/15/22 2145]   BP Temp Temp src Pulse Resp SpO2 Height Weight   (!) 162/112 98.8 °F (37.1 °C) -- 79 20 97 % 5' 6\" (1.676 m) 206 lb (93.4 kg)       Physical Exam  Vitals and nursing note reviewed. Constitutional:       General: He is not in acute distress. Appearance: He is obese. He is not ill-appearing, toxic-appearing or diaphoretic. HENT:      Head: Normocephalic and atraumatic. Mouth/Throat:      Mouth: Mucous membranes are moist.      Pharynx: Oropharynx is clear. Eyes:      Extraocular Movements: Extraocular movements intact. Pupils: Pupils are equal, round, and reactive to light. Cardiovascular:      Rate and Rhythm: Normal rate and regular rhythm. Pulses: Normal pulses. Heart sounds: Normal heart sounds. Pulmonary:      Effort: Pulmonary effort is normal.      Breath sounds: Normal breath sounds. Chest:      Chest wall: Tenderness (Reproducible TTP noted over the lateral pectorals, mild.) present.  No mass, deformity, swelling or crepitus. Abdominal:      General: There is no distension. Palpations: Abdomen is soft. Tenderness: There is no abdominal tenderness. Musculoskeletal:         General: No tenderness, deformity or signs of injury. Left shoulder: No swelling, deformity or bony tenderness. Normal range of motion. Normal strength. Left upper arm: Normal.      Left forearm: No swelling, edema, deformity or bony tenderness. Cervical back: Normal range of motion and neck supple. Right lower leg: No edema. Left lower leg: No edema. Skin:     General: Skin is warm and dry. Capillary Refill: Capillary refill takes less than 2 seconds. Neurological:      Mental Status: He is alert and oriented to person, place, and time. Mental status is at baseline. Psychiatric:         Mood and Affect: Mood normal.         Behavior: Behavior normal.         MDM:   Chart Reviewed: PMH and additional information as noted in HPI obtained from chart review    Vitals:    Vitals:    05/15/22 2145 05/15/22 2225 05/15/22 2230 05/15/22 2300   BP: (!) 162/112 (!) 148/108 (!) 146/107 (!) 151/107   Pulse: 79  73 73   Resp: 20      Temp: 98.8 °F (37.1 °C)      SpO2: 97% 97% 97% 96%   Weight: 206 lb (93.4 kg)      Height: 5' 6\" (1.676 m)          PROCEDURES:  Unless otherwise noted below, none  Procedures    LABS:  Labs Reviewed   COMPREHENSIVE METABOLIC PANEL - Abnormal; Notable for the following components:       Result Value    Glucose 111 (*)     All other components within normal limits   CBC WITH AUTO DIFFERENTIAL - Abnormal; Notable for the following components:    Hematocrit 41.9 (*)     All other components within normal limits   TROPONIN       XR CHEST PORTABLE    (Results Pending)       ED Course as of 05/15/22 2344   Sun May 15, 2022   2337 Troponin: <0.010  WNL, low suspicion for ACS.  [NA]   5289 CBC with Auto Differential(!):    WBC 7.5   RBC 4.99   Hemoglobin Quant 14.4 Hematocrit 41.9(!)   MCV 83.9   MCH 28.8   MCHC 34.3   RDW 13.4   Platelet Count 834   Neutrophils % 61.6   Lymphocyte % 29.6   Monocytes % 5.9   Eosinophils % 2.0   Basophils % 0.9   Neutrophils Absolute 4.6   Lymphocytes Absolute 2.2   Monocytes Absolute 0.4   Eosinophils Absolute 0.1   Basophils Absolute 0.1  Unremarkable [NA]   2337 Comprehensive Metabolic Panel(!):    Sodium 139   Potassium 3.6   Chloride 105   CO2 22   Anion Gap 12   GLUCOSE, FASTING,(!)   BUN,BUNPL 14   Creatinine 1.07   GFR Non- >60.0   GFR  >60.0   CALCIUM, SERUM, 514116 9.4   Total Protein 6.9   Albumin 4.5   Bilirubin 0.3   Alk Phos 60   ALT 26   AST 18   Globulin 2.4  Unremarkable [NA]   2337 EKG 12 Lead - Chest Pain  EKG showing normal sinus rhythm, rate of 72 bpm.  Normal axis, normal intervals. Possible LVH. No gross acute ST T wave abnormalities. [NA]      ED Course User Index  [NA] Sarah Sim MD       43 y.o. male ith a PMH clinically significant for HTN, HLD, Insomnia presenting to the ED via PV c/o left-sided chest pain, shoulder pain and forearm pain in addition to hypertension with initial onset at 4 PM today. 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 Upon initial evaluation, Pt hypertensive, but otherwise Afebrile, HDS and in NAD. PE as noted above. Labs, , EKG, and Imaging as noted above. Given findings, clinical presentation most likely consistent w/ musculoskeletal chest, shoulder and forearm pain. All reproducible with movement and palpation. No associated symptoms concerning for anginal equivalent at this time. EKG without acute ischemic changes. Does have several risk factors, but still not wholly concerning for cardiac etiology at this time. Chest x-ray unremarkable. Given findings, stable for further evaluation management as an outpatient.   Strongly encouraged follow-up with PCP regarding getting on treatment for high cholesterol if he truly does have it as documented in the chart and counseled return to the ED if any new or worsening symptoms. Did note asymptomatic hypertension. No indications to treat at this time. Pt was administered   Medications   acetaminophen (TYLENOL) tablet 1,000 mg (1,000 mg Oral Given 5/15/22 2321)   ibuprofen (ADVIL;MOTRIN) tablet 400 mg (400 mg Oral Given 5/15/22 2321)   methocarbamol (ROBAXIN) tablet 1,500 mg (1,500 mg Oral Given 5/15/22 2322)       Plan: Discharge home in good condition with meds as noted below and instructions to follow up with PCP . Pt stable and appropriate for further evaluation and management as an outpatient. and Patient understanding and amenable to the POC. CRITICAL CARE TIME   Total CriticalCare time was 0 minutes, excluding separately reportable procedures. There was a high probability of clinically significant/life threatening deterioration in the patient's condition which required my urgent intervention. FINAL IMPRESSION      1. Pain of left upper extremity    2. Chest wall pain    3.  Asymptomatic hypertension          DISPOSITION/PLAN   DISPOSITION Decision To Discharge 05/15/2022 11:12:39 PM      Current Discharge Medication List      START taking these medications    Details   acetaminophen (TYLENOL) 500 MG tablet Take 2 tablets by mouth every 6 hours as needed for Pain or Fever  Qty: 60 tablet, Refills: 0      methocarbamol (ROBAXIN) 500 MG tablet Take 2 tablets by mouth 4 times daily as needed (pain)  Qty: 40 tablet, Refills: 0              MD Hermann Beverly MD  05/15/22 6857

## 2022-05-16 NOTE — TELEPHONE ENCOUNTER
Subjective: Caller states [facility] ED gave me your number. I have numbness in my left arm. High blood pressure. \"     Takes Norvasc 5 mg/ daily    Left arm having some numbness, can't . Denies SOB, chest tightness, jaw pain, HA, blurred vision. BP has been high since having COVID. Arm is sun burned. 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 .. Current Symptoms: left arm numbness  /99. Onset: Onset - 2-3 hours. Associated Symptoms: NA    Pain Severity: NA    Temperature: NA     What has been tried: Nothing      Recommended disposition: See HCP within 4 Hours (or PCP triage)    Care advice provided, patient verbalizes understanding; denies any other questions or concerns; instructed to call back for any new or worsening symptoms. Patient/caller agrees to follow-up with PCP      Caller will page his on call provider for Emma Lim at [facility]     Attention Provider: Thank you for allowing me to participate in the care of your patient. The patient was connected to triage in response to symptoms provided. Please do not respond through this encounter as the response is not directed to a shared pool.         Reason for Disposition   [1] Numbness (i.e., loss of sensation) of the face, arm / hand, or leg / foot on one side of the body AND [2] gradual onset (e.g., days to weeks) AND [3] present now    Protocols used: NEUROLOGIC DEFICIT-ADULT-

## 2022-12-07 ENCOUNTER — OFFICE VISIT (OUTPATIENT)
Dept: FAMILY MEDICINE CLINIC | Age: 42
End: 2022-12-07
Payer: COMMERCIAL

## 2022-12-07 VITALS
DIASTOLIC BLOOD PRESSURE: 92 MMHG | HEART RATE: 62 BPM | OXYGEN SATURATION: 96 % | TEMPERATURE: 97.8 F | BODY MASS INDEX: 31.96 KG/M2 | SYSTOLIC BLOOD PRESSURE: 132 MMHG | WEIGHT: 198 LBS

## 2022-12-07 DIAGNOSIS — J02.9 SORE THROAT: ICD-10-CM

## 2022-12-07 DIAGNOSIS — K12.2 UVULITIS: Primary | ICD-10-CM

## 2022-12-07 LAB — S PYO AG THROAT QL: NORMAL

## 2022-12-07 PROCEDURE — G8427 DOCREV CUR MEDS BY ELIG CLIN: HCPCS | Performed by: NURSE PRACTITIONER

## 2022-12-07 PROCEDURE — 99213 OFFICE O/P EST LOW 20 MIN: CPT | Performed by: NURSE PRACTITIONER

## 2022-12-07 PROCEDURE — 87880 STREP A ASSAY W/OPTIC: CPT | Performed by: NURSE PRACTITIONER

## 2022-12-07 PROCEDURE — G8484 FLU IMMUNIZE NO ADMIN: HCPCS | Performed by: NURSE PRACTITIONER

## 2022-12-07 PROCEDURE — 1036F TOBACCO NON-USER: CPT | Performed by: NURSE PRACTITIONER

## 2022-12-07 PROCEDURE — G8417 CALC BMI ABV UP PARAM F/U: HCPCS | Performed by: NURSE PRACTITIONER

## 2022-12-07 RX ORDER — METHYLPREDNISOLONE 4 MG/1
TABLET ORAL
Qty: 1 KIT | Refills: 0 | Status: SHIPPED | OUTPATIENT
Start: 2022-12-07

## 2022-12-07 RX ORDER — AMLODIPINE BESYLATE 5 MG/1
5 TABLET ORAL DAILY
COMMUNITY
Start: 2022-03-07

## 2022-12-07 ASSESSMENT — ENCOUNTER SYMPTOMS
DIARRHEA: 0
TROUBLE SWALLOWING: 0
RHINORRHEA: 0
ABDOMINAL PAIN: 0
NAUSEA: 0
COUGH: 0
VOICE CHANGE: 0
SORE THROAT: 1

## 2022-12-07 NOTE — PROGRESS NOTES
Subjective  Minor Gordon, 43 y.o. male presents today with:  Chief Complaint   Patient presents with    Pharyngitis     Uvula swollen and sore throat. Started around Monday. HPI  Presents to [facility] for swelling of uvula   Symptoms began Monday   Sleep interrupted   States throat feels irritated and sore   Noted erythema of throat  Eating and drinking   Denies fever or chills   Denies GI abnormalities   Recurrent uvulitis. Initially began after COVID-19 Dec 2020                  Past Medical History:   Diagnosis Date    COVID-19       No past surgical history on file. No family history on file. Review of Systems   Constitutional:  Negative for activity change, appetite change, chills, diaphoresis, fatigue and fever. HENT:  Positive for sore throat. Negative for congestion, ear pain, rhinorrhea, trouble swallowing and voice change. Respiratory:  Negative for cough. Cardiovascular:  Negative for chest pain and palpitations. Gastrointestinal:  Negative for abdominal pain, diarrhea and nausea. Musculoskeletal:  Negative for myalgias. Neurological:  Negative for dizziness, light-headedness and headaches. Psychiatric/Behavioral:  Positive for sleep disturbance. PMH, Surgical Hx, Family Hx, and Social Hx reviewed and updated. Objective  Vitals:    12/07/22 1543 12/07/22 1547   BP: (!) 132/92 (!) 132/92   Site: Right Upper Arm Right Upper Arm   Position: Sitting    Cuff Size: Large Adult    Pulse: 62    Temp: 97.8 °F (36.6 °C)    TempSrc: Infrared    SpO2: 96%    Weight: 198 lb (89.8 kg)      BP Readings from Last 3 Encounters:   12/07/22 (!) 132/92   05/15/22 (!) 151/107   12/27/21 137/88     Wt Readings from Last 3 Encounters:   12/07/22 198 lb (89.8 kg)   05/15/22 206 lb (93.4 kg)   12/27/21 197 lb (89.4 kg)           Physical Exam  Vitals reviewed. Constitutional:       General: He is not in acute distress. Appearance: Normal appearance.  He is not toxic-appearing. HENT:      Right Ear: External ear normal.      Left Ear: External ear normal.      Nose: Nose normal.      Mouth/Throat:      Lips: Pink. Mouth: Mucous membranes are moist.      Pharynx: Oropharynx is clear. Uvula midline. Posterior oropharyngeal erythema and uvula swelling present. No oropharyngeal exudate. Eyes:      General: Lids are normal. Vision grossly intact. Conjunctiva/sclera: Conjunctivae normal.   Cardiovascular:      Rate and Rhythm: Normal rate. Pulmonary:      Effort: Pulmonary effort is normal.   Musculoskeletal:         General: Normal range of motion. Cervical back: Normal range of motion. No rigidity. Lymphadenopathy:      Cervical: No cervical adenopathy. Skin:     General: Skin is warm and dry. Coloration: Skin is not pale. Findings: No rash. Neurological:      General: No focal deficit present. Mental Status: He is alert and oriented to person, place, and time. Assessment & Plan    Diagnosis Orders   1. Uvulitis  methylPREDNISolone (MEDROL, CHIARA,) 4 MG tablet      2. Sore throat  POCT rapid strep A        Orders Placed This Encounter   Procedures    POCT rapid strep A     Orders Placed This Encounter   Medications    methylPREDNISolone (MEDROL, CHIARA,) 4 MG tablet     Sig: Take by mouth. Dispense:  1 kit     Refill:  0         Return if symptoms worsen or fail to improve, for follow up with PCP. Reviewed with the patient: current clinical status & medications. Side effects, adverse effects of the medication prescribed today, as well as treatment plan/rationale and result expectations have been discussed with the patient who expressed understanding. Oral Steroid Instructions: Take each dose with a small snack or meal to lessen potential GI upset. Follow dosing instructions provided with prescription. Common side effects include difficulty sleeping and irritability. Take full course as ordered.         How can you care for yourself at home? Be safe with medicines. Take your medicines exactly as prescribed. Call your doctor if you think you are having a problem with your medicine. You will get more details on the specific medicines your doctor prescribes. Gargle with warm salt water once an hour to help reduce swelling and relieve pain. Use 1 teaspoon of salt mixed in 1 cup of warm water. Try an over-the-counter throat spray to relieve throat pain. Ask your doctor if you can take an over-the-counter pain medicine, such as acetaminophen (Tylenol), ibuprofen (Advil, Motrin), or naproxen (Aleve). Read and follow all instructions on the label. Drink plenty of fluids. Fluids may help soothe your throat. If you have kidney, heart, or liver disease and have to limit fluids, talk with your doctor before you increase the amount of fluids you drink. Close follow up to evaluate treatment results and for coordination of care. I have reviewed the patient's medical history in detail and updated the computerized patient record.       TITUS Salas - LAILA

## 2022-12-08 ASSESSMENT — VISUAL ACUITY: OU: 1

## 2023-10-24 PROBLEM — S63.609A THUMB SPRAIN: Status: ACTIVE | Noted: 2023-10-24

## 2023-10-24 PROBLEM — M25.569 KNEE PAIN: Status: ACTIVE | Noted: 2023-10-24

## 2023-10-24 PROBLEM — M25.519 SHOULDER PAIN: Status: ACTIVE | Noted: 2023-10-24

## 2023-10-24 RX ORDER — IBUPROFEN 800 MG/1
800 TABLET ORAL 3 TIMES DAILY PRN
COMMUNITY

## 2023-10-26 ENCOUNTER — OFFICE VISIT (OUTPATIENT)
Dept: ORTHOPEDIC SURGERY | Facility: CLINIC | Age: 43
End: 2023-10-26
Payer: COMMERCIAL

## 2023-10-26 DIAGNOSIS — S83.242A ACUTE MEDIAL MENISCUS TEAR OF LEFT KNEE, INITIAL ENCOUNTER: ICD-10-CM

## 2023-10-26 PROCEDURE — 99213 OFFICE O/P EST LOW 20 MIN: CPT | Performed by: ORTHOPAEDIC SURGERY

## 2023-10-26 NOTE — PROGRESS NOTES
History of Present Illness   No chief complaint on file.      History of Present Illness   Patient presents after sustaining an injury to their knee.  They are complaining of ache, pain and discomfort.  The knee continues to swell since the injury.  Patient also complains of mechanical symptoms of catching, locking and popping.  Pain is aggravated by use and relieved by rest.  Injury happened 3 months ago when he tripped over a perera's hook of his wife.  He states his knee twisted and has been sore and uncomfortable.  Been treated conservatively over last 3 months with the oral steroids, ibuprofen, ice knee brace.  He is in home exercise therapy program and continues to have mechanical symptoms and swelling  Imaging  Radiographs Knee: No acute fractures or dislocations.  No arthritic change and well-preserved joint space    Assessment:   Left knee internal derangement suspected meniscus tear    Plan:  We reviewed the role of imaging, physical therapy, injections and the time frame to healing and correlation with outcome.  At this point I recommended getting an MRI and advanced imaging for potential underlying internal derangement.  Discussed the possibility of meniscus, cartilage or ligament pathology and the potential need for surgery.  Patient will plan to follow-up with us after MRI for further recommendations on ongoing care    NSAID: Ibuprofen.  GI side effects and medical risks discussed  Ice: 30 minutes on and off  Exercise home program: Medically directed knee therapy / Handout given  Injection options discussed  We will see him back after MRI for definitive plan     Physical Exam  Well-nourished, well-developed. No acute distress. Alert and oriented x3. Responds appropriately to questioning. Good mood. Normal affect.  Physical Exam  Left knee:  Skin healthy and intact  No gross swelling or ecchymosis  Trace to 1+ Effusion:   ROM: 110.  Some pain in full extension  Crepitance with range of motion  Pain  along the medial joint line.  +1 MCL with some minor pain  Positive Carmine´s test/PositiveApley Grind  Neurovascular exam normal distally  Palpable pedal pulse and good cap refill     Review of Systems   GENERAL: Negative for malaise, significant weight loss, fever  MUSCULOSKELETAL: See HPI  NEURO:  Negative     No past medical history on file.    Medication Documentation Review Audit    **Prior to Admission medications have not yet been reviewed**         Allergies   Allergen Reactions    Ceclor [Cefaclor] Unknown       Social History     Socioeconomic History    Marital status:      Spouse name: Not on file    Number of children: Not on file    Years of education: Not on file    Highest education level: Not on file   Occupational History    Not on file   Tobacco Use    Smoking status: Not on file    Smokeless tobacco: Not on file   Substance and Sexual Activity    Alcohol use: Not on file    Drug use: Not on file    Sexual activity: Not on file   Other Topics Concern    Not on file   Social History Narrative    Not on file     Social Determinants of Health     Financial Resource Strain: Not on file   Food Insecurity: Not on file   Transportation Needs: Not on file   Physical Activity: Not on file   Stress: Not on file   Social Connections: Not on file   Intimate Partner Violence: Not on file   Housing Stability: Not on file       No past surgical history on file.    No results found.

## 2023-11-07 ENCOUNTER — ANCILLARY PROCEDURE (OUTPATIENT)
Dept: RADIOLOGY | Facility: CLINIC | Age: 43
End: 2023-11-07
Payer: COMMERCIAL

## 2023-11-07 DIAGNOSIS — S83.242A ACUTE MEDIAL MENISCUS TEAR OF LEFT KNEE, INITIAL ENCOUNTER: ICD-10-CM

## 2023-11-07 PROCEDURE — 73721 MRI JNT OF LWR EXTRE W/O DYE: CPT | Mod: LEFT SIDE | Performed by: RADIOLOGY

## 2023-11-07 PROCEDURE — 73721 MRI JNT OF LWR EXTRE W/O DYE: CPT | Mod: LT

## 2023-11-11 ENCOUNTER — APPOINTMENT (OUTPATIENT)
Dept: RADIOLOGY | Facility: CLINIC | Age: 43
End: 2023-11-11
Payer: COMMERCIAL

## 2024-12-27 ENCOUNTER — HOSPITAL ENCOUNTER (OUTPATIENT)
Dept: CT IMAGING | Age: 44
Discharge: HOME | End: 2024-12-29
Payer: COMMERCIAL

## 2024-12-27 ENCOUNTER — OFFICE VISIT (OUTPATIENT)
Dept: FAMILY MEDICINE CLINIC | Age: 44
End: 2024-12-27

## 2024-12-27 VITALS
TEMPERATURE: 98.6 F | HEIGHT: 67 IN | WEIGHT: 198.6 LBS | HEART RATE: 73 BPM | SYSTOLIC BLOOD PRESSURE: 96 MMHG | OXYGEN SATURATION: 95 % | DIASTOLIC BLOOD PRESSURE: 74 MMHG | BODY MASS INDEX: 31.17 KG/M2

## 2024-12-27 DIAGNOSIS — R19.7 DIARRHEA OF PRESUMED INFECTIOUS ORIGIN: ICD-10-CM

## 2024-12-27 DIAGNOSIS — R19.7 DIARRHEA OF PRESUMED INFECTIOUS ORIGIN: Primary | ICD-10-CM

## 2024-12-27 LAB
ANION GAP SERPL CALCULATED.3IONS-SCNC: 11 MEQ/L (ref 9–15)
BUN SERPL-MCNC: 18 MG/DL (ref 6–20)
CALCIUM SERPL-MCNC: 8.5 MG/DL (ref 8.5–9.9)
CHLORIDE SERPL-SCNC: 95 MEQ/L (ref 95–107)
CO2 SERPL-SCNC: 26 MEQ/L (ref 20–31)
CREAT SERPL-MCNC: 1.09 MG/DL (ref 0.7–1.2)
GLUCOSE SERPL-MCNC: 91 MG/DL (ref 70–99)
INFLUENZA A ANTIBODY: NORMAL
INFLUENZA B ANTIBODY: NORMAL
Lab: NORMAL
PERFORMING INSTRUMENT: NORMAL
POTASSIUM SERPL-SCNC: 3.4 MEQ/L (ref 3.4–4.9)
QC PASS/FAIL: NORMAL
SARS-COV-2, POC: NORMAL
SODIUM SERPL-SCNC: 132 MEQ/L (ref 135–144)

## 2024-12-27 PROCEDURE — 74177 CT ABD & PELVIS W/CONTRAST: CPT

## 2024-12-27 PROCEDURE — 6360000004 HC RX CONTRAST MEDICATION: Performed by: PHYSICIAN ASSISTANT

## 2024-12-27 RX ORDER — VANCOMYCIN HYDROCHLORIDE 125 MG/1
125 CAPSULE ORAL 4 TIMES DAILY
Qty: 40 CAPSULE | Refills: 0 | Status: SHIPPED | OUTPATIENT
Start: 2024-12-27 | End: 2025-01-06

## 2024-12-27 RX ORDER — ALBUTEROL SULFATE 90 UG/1
2 INHALANT RESPIRATORY (INHALATION) EVERY 4 HOURS PRN
COMMUNITY
Start: 2024-08-13

## 2024-12-27 RX ORDER — IOPAMIDOL 755 MG/ML
18 INJECTION, SOLUTION INTRAVASCULAR
Status: COMPLETED | OUTPATIENT
Start: 2024-12-27 | End: 2024-12-27

## 2024-12-27 RX ORDER — IOPAMIDOL 755 MG/ML
75 INJECTION, SOLUTION INTRAVASCULAR
Status: COMPLETED | OUTPATIENT
Start: 2024-12-27 | End: 2024-12-27

## 2024-12-27 RX ADMIN — IOPAMIDOL 75 ML: 755 INJECTION, SOLUTION INTRAVENOUS at 16:18

## 2024-12-27 RX ADMIN — IOPAMIDOL 18 ML: 755 INJECTION, SOLUTION INTRAVENOUS at 16:19

## 2024-12-27 NOTE — PROGRESS NOTES
as needed for Pain or Fever 60 tablet 0    ibuprofen (IBU) 400 MG tablet Take 1 tablet by mouth every 6 hours as needed for Pain 120 tablet 0    methylPREDNISolone (MEDROL, CHIARA,) 4 MG tablet Take by mouth. (Patient not taking: Reported on 12/27/2024) 1 kit 0    [DISCONTINUED] celecoxib (CELEBREX) 200 MG capsule Take 1 capsule by mouth 2 times daily       No current facility-administered medications on file prior to visit.     Ceclor [cefaclor]    Review of Systems   Constitutional:  Positive for fatigue.   HENT:  Negative for ear discharge, ear pain, hearing loss, nosebleeds and tinnitus.    Eyes:  Negative for photophobia, pain, discharge and redness.   Respiratory:  Negative for shortness of breath.    Cardiovascular:  Negative for chest pain.   Gastrointestinal:  Positive for abdominal pain, diarrhea, nausea and vomiting. Negative for blood in stool.   Endocrine: Negative for polydipsia.   Musculoskeletal:  Negative for myalgias.   Skin:  Negative for rash.   Neurological:  Negative for headaches.   Hematological:  Does not bruise/bleed easily.   Psychiatric/Behavioral:  Negative for hallucinations and suicidal ideas.    All other systems reviewed and are negative.      Objective:   BP 96/74 (Site: Left Upper Arm, Position: Sitting, Cuff Size: Medium Adult)   Pulse 73   Temp 98.6 °F (37 °C) (Oral)   Ht 1.695 m (5' 6.75\")   Wt 90.1 kg (198 lb 9.6 oz)   SpO2 95%   BMI 31.34 kg/m²     Physical Exam  Vitals and nursing note reviewed.   Constitutional:       Appearance: He is well-developed. He is ill-appearing.   HENT:      Head: Normocephalic and atraumatic.      Right Ear: External ear normal.      Left Ear: External ear normal.      Nose: Nose normal. No congestion.      Mouth/Throat:      Mouth: Mucous membranes are moist.   Eyes:      General: No scleral icterus.     Conjunctiva/sclera: Conjunctivae normal.      Pupils: Pupils are equal, round, and reactive to light.   Neck:      Thyroid: No thyromegaly.

## 2024-12-28 DIAGNOSIS — R19.7 DIARRHEA OF PRESUMED INFECTIOUS ORIGIN: ICD-10-CM

## 2024-12-29 ENCOUNTER — TELEPHONE (OUTPATIENT)
Age: 44
End: 2024-12-29

## 2024-12-29 LAB
ADV 40+41 DNA STL QL NAA+NON-PROBE: NOT DETECTED
C CAYETANENSIS DNA STL QL NAA+NON-PROBE: NOT DETECTED
C COLI+JEJ+UPSA DNA STL QL NAA+NON-PROBE: NOT DETECTED
CRYPTOSP DNA STL QL NAA+NON-PROBE: NOT DETECTED
E HISTOLYT DNA STL QL NAA+NON-PROBE: NOT DETECTED
EAEC PAA PLAS AGGR+AATA ST NAA+NON-PRB: NOT DETECTED
EC STX1+STX2 GENES STL QL NAA+NON-PROBE: NOT DETECTED
EPEC EAE GENE STL QL NAA+NON-PROBE: NOT DETECTED
ETEC LTA+ST1A+ST1B TOX ST NAA+NON-PROBE: NOT DETECTED
G LAMBLIA DNA STL QL NAA+NON-PROBE: NOT DETECTED
GI PATH DNA+RNA PNL STL NAA+NON-PROBE: NOT DETECTED
NOROVIRUS GI+II RNA STL QL NAA+NON-PROBE: DETECTED
P SHIGELLOIDES DNA STL QL NAA+NON-PROBE: NOT DETECTED
RVA RNA STL QL NAA+NON-PROBE: NOT DETECTED
S ENT+BONG DNA STL QL NAA+NON-PROBE: NOT DETECTED
SAPO I+II+IV+V RNA STL QL NAA+NON-PROBE: NOT DETECTED
SHIGELLA SP+EIEC IPAH ST NAA+NON-PROBE: NOT DETECTED
V CHOL+PARA+VUL DNA STL QL NAA+NON-PROBE: NOT DETECTED
V CHOLERAE DNA STL QL NAA+NON-PROBE: NOT DETECTED
Y ENTEROCOL DNA STL QL NAA+NON-PROBE: NOT DETECTED

## 2025-02-20 NOTE — ED TRIAGE NOTES
Patient presents to the er with complaints of hypertension and right arm pain   The pain hurts more when he makes a fist with his hand   Denies Chest pain   Intermittent SOB   States that he took his BP just to see what it was   Resp even and unlabored at this time
Yes